# Patient Record
Sex: MALE | Race: WHITE | NOT HISPANIC OR LATINO
[De-identification: names, ages, dates, MRNs, and addresses within clinical notes are randomized per-mention and may not be internally consistent; named-entity substitution may affect disease eponyms.]

---

## 2017-05-15 ENCOUNTER — APPOINTMENT (OUTPATIENT)
Dept: CARDIOLOGY | Facility: CLINIC | Age: 52
End: 2017-05-15

## 2017-05-15 VITALS
WEIGHT: 173 LBS | HEIGHT: 65 IN | BODY MASS INDEX: 28.82 KG/M2 | HEART RATE: 63 BPM | DIASTOLIC BLOOD PRESSURE: 70 MMHG | SYSTOLIC BLOOD PRESSURE: 112 MMHG

## 2017-08-18 ENCOUNTER — APPOINTMENT (OUTPATIENT)
Dept: CARDIOLOGY | Facility: CLINIC | Age: 52
End: 2017-08-18

## 2017-08-18 VITALS
HEIGHT: 65 IN | SYSTOLIC BLOOD PRESSURE: 126 MMHG | HEART RATE: 93 BPM | BODY MASS INDEX: 28.66 KG/M2 | WEIGHT: 172 LBS | DIASTOLIC BLOOD PRESSURE: 74 MMHG

## 2017-08-18 DIAGNOSIS — I25.2 OLD MYOCARDIAL INFARCTION: ICD-10-CM

## 2017-08-18 RX ORDER — VENLAFAXINE 37.5 MG/1
37.5 TABLET ORAL DAILY
Refills: 0 | Status: DISCONTINUED | COMMUNITY
End: 2017-08-18

## 2017-12-08 ENCOUNTER — APPOINTMENT (OUTPATIENT)
Dept: CARDIOLOGY | Facility: CLINIC | Age: 52
End: 2017-12-08

## 2017-12-12 ENCOUNTER — MEDICATION RENEWAL (OUTPATIENT)
Age: 52
End: 2017-12-12

## 2017-12-18 ENCOUNTER — RX RENEWAL (OUTPATIENT)
Age: 52
End: 2017-12-18

## 2018-04-22 ENCOUNTER — EMERGENCY (EMERGENCY)
Facility: HOSPITAL | Age: 53
LOS: 0 days | Discharge: HOME | End: 2018-04-23
Admitting: PHYSICIAN ASSISTANT

## 2018-04-22 VITALS
HEART RATE: 73 BPM | OXYGEN SATURATION: 98 % | RESPIRATION RATE: 18 BRPM | TEMPERATURE: 97 F | SYSTOLIC BLOOD PRESSURE: 131 MMHG | DIASTOLIC BLOOD PRESSURE: 90 MMHG
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
## 2018-04-22 DIAGNOSIS — S61.214A LACERATION WITHOUT FOREIGN BODY OF RIGHT RING FINGER WITHOUT DAMAGE TO NAIL, INITIAL ENCOUNTER: ICD-10-CM

## 2018-04-22 DIAGNOSIS — S62.644A NONDISPLACED FRACTURE OF PROXIMAL PHALANX OF RIGHT RING FINGER, INITIAL ENCOUNTER FOR CLOSED FRACTURE: ICD-10-CM
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
## 2018-04-22 DIAGNOSIS — Z23 ENCOUNTER FOR IMMUNIZATION: ICD-10-CM

## 2018-04-22 DIAGNOSIS — Z79.899 OTHER LONG TERM (CURRENT) DRUG THERAPY: ICD-10-CM

## 2018-04-23 ENCOUNTER — EMERGENCY (EMERGENCY)
Facility: HOSPITAL | Age: 53
LOS: 1 days | Discharge: HOME | End: 2018-04-23
Admitting: PHYSICIAN ASSISTANT

## 2018-04-23 VITALS
HEART RATE: 80 BPM | SYSTOLIC BLOOD PRESSURE: 133 MMHG | TEMPERATURE: 98 F | RESPIRATION RATE: 18 BRPM | DIASTOLIC BLOOD PRESSURE: 72 MMHG | OXYGEN SATURATION: 98 %

## 2018-04-23 DIAGNOSIS — E78.5 HYPERLIPIDEMIA, UNSPECIFIED: ICD-10-CM
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
## 2018-04-23 DIAGNOSIS — I10 ESSENTIAL (PRIMARY) HYPERTENSION: ICD-10-CM

## 2018-04-23 DIAGNOSIS — Z79.2 LONG TERM (CURRENT) USE OF ANTIBIOTICS: ICD-10-CM
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
## 2018-04-23 DIAGNOSIS — M79.644 PAIN IN RIGHT FINGER(S): ICD-10-CM

## 2018-04-23 DIAGNOSIS — I25.10 ATHEROSCLEROTIC HEART DISEASE OF NATIVE CORONARY ARTERY WITHOUT ANGINA PECTORIS: ICD-10-CM
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
## 2018-04-23 DIAGNOSIS — S60.142A CONTUSION OF LEFT RING FINGER WITH DAMAGE TO NAIL, INITIAL ENCOUNTER: ICD-10-CM

## 2018-04-23 DIAGNOSIS — S62.604A FRACTURE OF UNSPECIFIED PHALANX OF RIGHT RING FINGER, INITIAL ENCOUNTER FOR CLOSED FRACTURE: ICD-10-CM

## 2018-04-23 RX ORDER — TETANUS TOXOID, REDUCED DIPHTHERIA TOXOID AND ACELLULAR PERTUSSIS VACCINE, ADSORBED 5; 2.5; 8; 8; 2.5 [IU]/.5ML; [IU]/.5ML; UG/.5ML; UG/.5ML; UG/.5ML
0.5 SUSPENSION INTRAMUSCULAR ONCE
Qty: 0 | Refills: 0 | Status: COMPLETED | OUTPATIENT
Start: 2018-04-23 | End: 2018-04-23

## 2018-04-23 RX ORDER — CEPHALEXIN 500 MG
1 CAPSULE ORAL
Qty: 28 | Refills: 0 | OUTPATIENT
Start: 2018-04-23 | End: 2018-04-29

## 2018-04-23 RX ORDER — CEPHALEXIN 500 MG
500 CAPSULE ORAL ONCE
Qty: 0 | Refills: 0 | Status: COMPLETED | OUTPATIENT
Start: 2018-04-23 | End: 2018-04-23

## 2018-04-23 RX ADMIN — TETANUS TOXOID, REDUCED DIPHTHERIA TOXOID AND ACELLULAR PERTUSSIS VACCINE, ADSORBED 0.5 MILLILITER(S): 5; 2.5; 8; 8; 2.5 SUSPENSION INTRAMUSCULAR at 01:25

## 2018-04-23 RX ADMIN — Medication 500 MILLIGRAM(S): at 01:25

## 2018-04-23 NOTE — ED PROVIDER NOTE - OBJECTIVE STATEMENT
51 yo male with h/o CAD, HTN, HLD presents to the ED c/o bleeding from his right 4th fingernail since yesterday. Patient was evaluated in ED yesterday for injury to same finger. + fx on xray, started in abx. Patient states hes still has small amount of bleeding from fingernail since yesterday. Denies fever, chills, UE weakness or paresthesias.

## 2018-04-23 NOTE — ED PROVIDER NOTE - PHYSICAL EXAMINATION
right hand with tenderness and swelling to distal tip, there is 100% subungal hematoma. FROM of all the joints and sensory intact

## 2018-04-23 NOTE — ED PROVIDER NOTE - PHYSICAL EXAMINATION
GENERAL:  well appearing, non-toxic male in no acute distress  SKIN: skin warm, pink and dry. MMM. + small amount of bleeding from right 4th fingernail. + abrasion. + subungual hematoma. cap refill < 2 sec  PULM: CTAB. Normal respiratory effort. No respiratory distress. No wheezes, stridor, rales or rhonchi. No retractions  CV: RRR, no M/R/G.   MSK: + TTP to distal tip of right 4th finger. FROM of DIP and PIP. radial pulses equal and intact bilaterally.  NEURO: sensation of right hand and digits intact

## 2018-04-23 NOTE — ED PROVIDER NOTE - NS ED ROS FT
Constitutional: no fever, chills or generalized weakness   Cardiovascular: no chest pain, no sob  Respiratory: no cough, no shortness of breath  Musculoskeletal: + right 4th finger fx  Integumentary: + bleeding from right 4th fingernail since yesterday  Neurological: no UE weakness or paresthesias

## 2018-09-10 ENCOUNTER — EMERGENCY (EMERGENCY)
Facility: HOSPITAL | Age: 53
LOS: 0 days | Discharge: HOME | End: 2018-09-11
Attending: STUDENT IN AN ORGANIZED HEALTH CARE EDUCATION/TRAINING PROGRAM | Admitting: STUDENT IN AN ORGANIZED HEALTH CARE EDUCATION/TRAINING PROGRAM

## 2018-09-10 VITALS
SYSTOLIC BLOOD PRESSURE: 175 MMHG | TEMPERATURE: 98 F | DIASTOLIC BLOOD PRESSURE: 90 MMHG | OXYGEN SATURATION: 100 % | HEART RATE: 95 BPM | RESPIRATION RATE: 16 BRPM | WEIGHT: 171.96 LBS

## 2018-09-10 DIAGNOSIS — R07.89 OTHER CHEST PAIN: ICD-10-CM

## 2018-09-10 DIAGNOSIS — R51 HEADACHE: ICD-10-CM

## 2018-09-10 DIAGNOSIS — I25.10 ATHEROSCLEROTIC HEART DISEASE OF NATIVE CORONARY ARTERY WITHOUT ANGINA PECTORIS: ICD-10-CM

## 2018-09-10 DIAGNOSIS — E78.5 HYPERLIPIDEMIA, UNSPECIFIED: ICD-10-CM

## 2018-09-10 DIAGNOSIS — Z79.2 LONG TERM (CURRENT) USE OF ANTIBIOTICS: ICD-10-CM

## 2018-09-10 DIAGNOSIS — Z79.82 LONG TERM (CURRENT) USE OF ASPIRIN: ICD-10-CM

## 2018-09-10 DIAGNOSIS — Z95.5 PRESENCE OF CORONARY ANGIOPLASTY IMPLANT AND GRAFT: ICD-10-CM

## 2018-09-10 DIAGNOSIS — R42 DIZZINESS AND GIDDINESS: ICD-10-CM

## 2018-09-10 DIAGNOSIS — Z79.02 LONG TERM (CURRENT) USE OF ANTITHROMBOTICS/ANTIPLATELETS: ICD-10-CM

## 2018-09-10 LAB
ALBUMIN SERPL ELPH-MCNC: 5 G/DL — SIGNIFICANT CHANGE UP (ref 3.5–5.2)
ALP SERPL-CCNC: 75 U/L — SIGNIFICANT CHANGE UP (ref 30–115)
ALT FLD-CCNC: 54 U/L — HIGH (ref 0–41)
ANION GAP SERPL CALC-SCNC: 16 MMOL/L — HIGH (ref 7–14)
AST SERPL-CCNC: 28 U/L — SIGNIFICANT CHANGE UP (ref 0–41)
BASOPHILS # BLD AUTO: 0.05 K/UL — SIGNIFICANT CHANGE UP (ref 0–0.2)
BASOPHILS NFR BLD AUTO: 0.6 % — SIGNIFICANT CHANGE UP (ref 0–1)
BILIRUB SERPL-MCNC: 1.1 MG/DL — SIGNIFICANT CHANGE UP (ref 0.2–1.2)
BUN SERPL-MCNC: 12 MG/DL — SIGNIFICANT CHANGE UP (ref 10–20)
CALCIUM SERPL-MCNC: 10 MG/DL — SIGNIFICANT CHANGE UP (ref 8.5–10.1)
CHLORIDE SERPL-SCNC: 99 MMOL/L — SIGNIFICANT CHANGE UP (ref 98–110)
CO2 SERPL-SCNC: 27 MMOL/L — SIGNIFICANT CHANGE UP (ref 17–32)
CREAT SERPL-MCNC: 1 MG/DL — SIGNIFICANT CHANGE UP (ref 0.7–1.5)
EOSINOPHIL # BLD AUTO: 0.29 K/UL — SIGNIFICANT CHANGE UP (ref 0–0.7)
EOSINOPHIL NFR BLD AUTO: 3.4 % — SIGNIFICANT CHANGE UP (ref 0–8)
GLUCOSE SERPL-MCNC: 100 MG/DL — HIGH (ref 70–99)
HCT VFR BLD CALC: 44.6 % — SIGNIFICANT CHANGE UP (ref 42–52)
HGB BLD-MCNC: 14.6 G/DL — SIGNIFICANT CHANGE UP (ref 14–18)
IMM GRANULOCYTES NFR BLD AUTO: 0.1 % — SIGNIFICANT CHANGE UP (ref 0.1–0.3)
LYMPHOCYTES # BLD AUTO: 2.91 K/UL — SIGNIFICANT CHANGE UP (ref 1.2–3.4)
LYMPHOCYTES # BLD AUTO: 33.9 % — SIGNIFICANT CHANGE UP (ref 20.5–51.1)
MAGNESIUM SERPL-MCNC: 2.2 MG/DL — SIGNIFICANT CHANGE UP (ref 1.8–2.4)
MCHC RBC-ENTMCNC: 27 PG — SIGNIFICANT CHANGE UP (ref 27–31)
MCHC RBC-ENTMCNC: 32.7 G/DL — SIGNIFICANT CHANGE UP (ref 32–37)
MCV RBC AUTO: 82.4 FL — SIGNIFICANT CHANGE UP (ref 80–94)
MONOCYTES # BLD AUTO: 0.71 K/UL — HIGH (ref 0.1–0.6)
MONOCYTES NFR BLD AUTO: 8.3 % — SIGNIFICANT CHANGE UP (ref 1.7–9.3)
NEUTROPHILS # BLD AUTO: 4.62 K/UL — SIGNIFICANT CHANGE UP (ref 1.4–6.5)
NEUTROPHILS NFR BLD AUTO: 53.7 % — SIGNIFICANT CHANGE UP (ref 42.2–75.2)
NRBC # BLD: 0 /100 WBCS — SIGNIFICANT CHANGE UP (ref 0–0)
PHOSPHATE SERPL-MCNC: 4.5 MG/DL — SIGNIFICANT CHANGE UP (ref 2.1–4.9)
PLATELET # BLD AUTO: 335 K/UL — SIGNIFICANT CHANGE UP (ref 130–400)
POTASSIUM SERPL-MCNC: 4.4 MMOL/L — SIGNIFICANT CHANGE UP (ref 3.5–5)
POTASSIUM SERPL-SCNC: 4.4 MMOL/L — SIGNIFICANT CHANGE UP (ref 3.5–5)
PROT SERPL-MCNC: 8.1 G/DL — HIGH (ref 6–8)
RBC # BLD: 5.41 M/UL — SIGNIFICANT CHANGE UP (ref 4.7–6.1)
RBC # FLD: 12.6 % — SIGNIFICANT CHANGE UP (ref 11.5–14.5)
SODIUM SERPL-SCNC: 142 MMOL/L — SIGNIFICANT CHANGE UP (ref 135–146)
WBC # BLD: 8.59 K/UL — SIGNIFICANT CHANGE UP (ref 4.8–10.8)
WBC # FLD AUTO: 8.59 K/UL — SIGNIFICANT CHANGE UP (ref 4.8–10.8)

## 2018-09-10 RX ORDER — PROCHLORPERAZINE MALEATE 5 MG
10 TABLET ORAL ONCE
Qty: 0 | Refills: 0 | Status: COMPLETED | OUTPATIENT
Start: 2018-09-10 | End: 2018-09-10

## 2018-09-10 RX ORDER — MECLIZINE HCL 12.5 MG
50 TABLET ORAL ONCE
Qty: 0 | Refills: 0 | Status: COMPLETED | OUTPATIENT
Start: 2018-09-10 | End: 2018-09-10

## 2018-09-10 RX ORDER — SODIUM CHLORIDE 9 MG/ML
1000 INJECTION, SOLUTION INTRAVENOUS ONCE
Qty: 0 | Refills: 0 | Status: COMPLETED | OUTPATIENT
Start: 2018-09-10 | End: 2018-09-10

## 2018-09-10 RX ORDER — KETOROLAC TROMETHAMINE 30 MG/ML
15 SYRINGE (ML) INJECTION ONCE
Qty: 0 | Refills: 0 | Status: DISCONTINUED | OUTPATIENT
Start: 2018-09-10 | End: 2018-09-10

## 2018-09-10 RX ADMIN — Medication 15 MILLIGRAM(S): at 22:32

## 2018-09-10 RX ADMIN — Medication 15 MILLIGRAM(S): at 23:22

## 2018-09-10 RX ADMIN — SODIUM CHLORIDE 2000 MILLILITER(S): 9 INJECTION, SOLUTION INTRAVENOUS at 22:33

## 2018-09-10 RX ADMIN — Medication 10 MILLIGRAM(S): at 22:10

## 2018-09-10 RX ADMIN — Medication 50 MILLIGRAM(S): at 22:33

## 2018-09-10 RX ADMIN — SODIUM CHLORIDE 1000 MILLILITER(S): 9 INJECTION, SOLUTION INTRAVENOUS at 23:22

## 2018-09-10 NOTE — ED PROVIDER NOTE - OBJECTIVE STATEMENT
52 y/o male with PMH of CAD s/p bypass and 1 stent on ASA and Plavix, HLD who presents to ED for headache, dizziness and chest pain. Patient states that for the past 2 weeks he has had a heavy pressure in the left side of his chest. No diaphoresis,  radiation of pain, nausea. For the past 1 day patient has had a headache described as a pressure in the front of his head. No photophobia, phonophobia, change in vision or hearing. Pt also states that he has had dizziness described as the room spinning. Pt has a history of headache and dizziness and states symptoms feel similar to prior episodes. Pt has taken no medications for pain or dizziness.

## 2018-09-10 NOTE — ED ADULT NURSE NOTE - NSIMPLEMENTINTERV_GEN_ALL_ED
Implemented All Universal Safety Interventions:  [place] to call system. Call bell, personal items and telephone within reach. Instruct patient to call for assistance. Room bathroom lighting operational. Non-slip footwear when patient is off stretcher. Physically safe environment: no spills, clutter or unnecessary equipment. Stretcher in lowest position, wheels locked, appropriate side rails in place.

## 2018-09-10 NOTE — ED PROVIDER NOTE - ATTENDING CONTRIBUTION TO CARE
53 hx hld, cad s/p bypass, stent, on asa/plavix here for 1 day hx HA and dizziness. tight frontal pain and in jaw. pt went for a walk and had vertiginous dizziness. similar ha/dizziness in past.  vertigo worse with head movement. in past sx typically improve spontaneously. pt seen before for this, was admitted for a day and not given dx per pt. ha gradual onset, not worst headache of his life. dizziness lasted longer than usual but has since resolved. because of increased duration, pt came in out of concern it might be related to his heart. no cp, sob, n,v, f,c,hearing change, ataxia, gait instability.     vss  exam  card-rrr  lungs-ctab  abd-sntnd  neuro-horizontal nystagmus to the right, vertigo elicited with head movement. HINTS suggesting peripheral cause  Head impulse test: loss of fixation with corrective saccades when head turned to the left  Nystagmus: unidirectional, horizontal right-beating nystagmus  Skew deviation: grossly absent    basic labs w/ trop, ekg, cth  sup care, fluids, analgesia  likely tension ha w/ peripheral vertigo based on hx and exam  if w/u negative, can f/u w/ neuro 53 hx hld, cad s/p bypass, stent, on asa/plavix here for 1 day hx HA and dizziness. tight frontal pain and in jaw. pt went for a walk and had vertiginous dizziness. similar ha/dizziness in past.  vertigo worse with head movement. in past sx typically improve spontaneously. pt seen before for this, was admitted for a day and not given dx per pt. ha gradual onset, not worst headache of his life. dizziness lasted longer than usual but has since resolved. because of increased duration, pt came in out of concern it might be related to his heart. no sob, n,v, f,c,hearing change, ataxia, gait instability.   To me, pt also endorsed 2-3 weeks of intermitent L sided chest pressure. often in morning and without relation to exertion. no lightheadedness, sob, during those episodes. pt endorsing L sided chest earlier today. last stress test/card w/u > 1 year ago    vss  exam  card-rrr  lungs-ctab  abd-sntnd  neuro-horizontal nystagmus to the right, vertigo elicited with head movement. HINTS suggesting peripheral cause  Head impulse test: loss of fixation with corrective saccades when head turned to the left  Nystagmus: unidirectional, horizontal right-beating nystagmus  Skew deviation: grossly absent    basic labs w/ trop, ekg, cth  sup care, fluids, analgesia  likely tension ha w/ peripheral vertigo based on hx and exam  will obs for cp r/o

## 2018-09-10 NOTE — ED PROVIDER NOTE - PHYSICAL EXAMINATION
CONSTITUTIONAL: Well-developed; well-nourished; in no acute distress.   SKIN: warm, dry  HEAD: Normocephalic; atraumatic.  EYES: no conjunctival injection. PERRL.   ENT: No nasal discharge; airway clear.  NECK: Supple; non tender.  CARD: S1, S2 normal; no murmurs, gallops, or rubs. Regular rate and rhythm.   RESP: No wheezes, rales or rhonchi.  ABD: soft ntnd  EXT: Normal ROM.  No clubbing, cyanosis or edema.   LYMPH: No acute cervical adenopathy.  NEURO: Alert, oriented, grossly unremarkable. CNs 2-12 intact. Normal gait and station.   PSYCH: Cooperative, appropriate.

## 2018-09-10 NOTE — ED PROVIDER NOTE - PROGRESS NOTE DETAILS
ha/vertiginous dizziness improving I had extensive discussion of risks and benefits of pursuing further medical evaluation and/or care with patient and any available family/friends, including but not limited to worsening of clinical status, disability, and death; patient still electing to leave against medical advice. Patient is awake, alert, oriented and demonstrates full capacity and insight into illness. Patient aware and encouraged to return immediately to this ED or nearest ED if patient decides to change mind regarding care or if patient experiences any new, worsening, or concerning symptoms. Any available test results were discussed with patient and/or family. Verbal instructions given, patient endorsed understanding. Written discharge instructions additionally given, including follow-up plan.

## 2018-09-10 NOTE — ED PROVIDER NOTE - NS ED ROS FT
Constitutional: See HPI.  Eyes: No visual changes, eye pain or discharge.  ENMT: No hearing changes, pain, discharge or infections. No neck pain or stiffness.  Cardiac: as per HPI.   Respiratory: No cough or respiratory distress.   GI: No nausea, vomiting, diarrhea or abdominal pain.  : No dysuria, frequency or burning.  MS: No myalgia, muscle weakness, joint pain or back pain.  Neuro: Dizziness.  No headache or weakness. No LOC.  Skin: No skin rash.

## 2018-09-10 NOTE — ED ADULT NURSE NOTE - OBJECTIVE STATEMENT
c/o headache and jaw pain that started this am. denies any photophobia, no nausea, no vomiting  report some drowsiness in the evening  denies taking any otc medication for the pain, only took his prescribed heart medicaitons

## 2018-09-10 NOTE — ED PROVIDER NOTE - MEDICAL DECISION MAKING DETAILS
would like to admit pt for moderate risk CP, however, pt choosing to AMA. vertiginous sx improved w/ supp care.

## 2018-09-11 VITALS
HEART RATE: 79 BPM | RESPIRATION RATE: 18 BRPM | OXYGEN SATURATION: 98 % | DIASTOLIC BLOOD PRESSURE: 76 MMHG | SYSTOLIC BLOOD PRESSURE: 126 MMHG

## 2018-09-11 LAB — TROPONIN T SERPL-MCNC: <0.01 NG/ML — SIGNIFICANT CHANGE UP

## 2018-09-11 RX ORDER — MECLIZINE HCL 12.5 MG
25 TABLET ORAL ONCE
Qty: 0 | Refills: 0 | Status: COMPLETED | OUTPATIENT
Start: 2018-09-11 | End: 2018-09-11

## 2018-09-11 RX ADMIN — Medication 25 MILLIGRAM(S): at 05:16

## 2018-10-26 ENCOUNTER — APPOINTMENT (OUTPATIENT)
Dept: CARDIOLOGY | Facility: CLINIC | Age: 53
End: 2018-10-26

## 2018-10-26 VITALS
HEART RATE: 84 BPM | WEIGHT: 169 LBS | BODY MASS INDEX: 28.16 KG/M2 | DIASTOLIC BLOOD PRESSURE: 74 MMHG | SYSTOLIC BLOOD PRESSURE: 142 MMHG | HEIGHT: 65 IN

## 2019-02-01 ENCOUNTER — APPOINTMENT (OUTPATIENT)
Dept: CARDIOLOGY | Facility: CLINIC | Age: 54
End: 2019-02-01

## 2019-02-01 VITALS
HEART RATE: 87 BPM | BODY MASS INDEX: 28.32 KG/M2 | HEIGHT: 65 IN | DIASTOLIC BLOOD PRESSURE: 72 MMHG | WEIGHT: 170 LBS | SYSTOLIC BLOOD PRESSURE: 128 MMHG

## 2019-02-01 NOTE — PHYSICAL EXAM
[General Appearance - Well Developed] : well developed [Normal Appearance] : normal appearance [Well Groomed] : well groomed [General Appearance - Well Nourished] : well nourished [No Deformities] : no deformities [General Appearance - In No Acute Distress] : no acute distress [Normal Oral Mucosa] : normal oral mucosa [Normal Jugular Venous A Waves Present] : normal jugular venous A waves present [Normal Jugular Venous V Waves Present] : normal jugular venous V waves present [No Jugular Venous Bolanos A Waves] : no jugular venous bolanos A waves [Respiration, Rhythm And Depth] : normal respiratory rhythm and effort [Exaggerated Use Of Accessory Muscles For Inspiration] : no accessory muscle use [Auscultation Breath Sounds / Voice Sounds] : lungs were clear to auscultation bilaterally [Heart Rate And Rhythm] : heart rate and rhythm were normal [Heart Sounds] : normal S1 and S2 [Murmurs] : no murmurs present [Abdomen Soft] : soft [Abdomen Tenderness] : non-tender [Abdomen Mass (___ Cm)] : no abdominal mass palpated [Nail Clubbing] : no clubbing of the fingernails [Cyanosis, Localized] : no localized cyanosis [Petechial Hemorrhages (___cm)] : no petechial hemorrhages [] : no ischemic changes [Skin Color & Pigmentation] : normal skin color and pigmentation

## 2019-02-01 NOTE — ASSESSMENT
[FreeTextEntry1] : 52 yo male with pmhx and presentation as above\par cad, ccs class 2/3 symptoms\par meds compliance reinforced\par repeat labs today\par stress mpi\par 2d echo\par f/up with neuro/psych\par aggressive risk modif\par diet and act as tolerated\par rtc post testing\par

## 2019-02-01 NOTE — HISTORY OF PRESENT ILLNESS
[FreeTextEntry1] : 54 yo male with pmhx of cad/mi/abg/pci is here for a f/up\par extensive card hx, mics cabg 2012, followed by pci with tejinder 2014\par was lost to follow\par ongoing chest pain, unchanged, s/p stress mpi - low risk 2016\par et is mild to mod limited sec to a variety reasons\par under neuro, psych and ortho mx, on ssri\par few episodes of nocturnal angina \par no other cvs complains\par ros is otherwise as below

## 2019-03-19 ENCOUNTER — APPOINTMENT (OUTPATIENT)
Dept: CARDIOLOGY | Facility: CLINIC | Age: 54
End: 2019-03-19

## 2019-03-27 ENCOUNTER — LABORATORY RESULT (OUTPATIENT)
Age: 54
End: 2019-03-27

## 2019-03-27 ENCOUNTER — OUTPATIENT (OUTPATIENT)
Dept: OUTPATIENT SERVICES | Facility: HOSPITAL | Age: 54
LOS: 1 days | Discharge: HOME | End: 2019-03-27

## 2019-03-27 DIAGNOSIS — E78.5 HYPERLIPIDEMIA, UNSPECIFIED: ICD-10-CM

## 2019-04-19 ENCOUNTER — APPOINTMENT (OUTPATIENT)
Dept: CARDIOLOGY | Facility: CLINIC | Age: 54
End: 2019-04-19

## 2019-05-08 ENCOUNTER — APPOINTMENT (OUTPATIENT)
Dept: CARDIOLOGY | Facility: CLINIC | Age: 54
End: 2019-05-08
Payer: OTHER MISCELLANEOUS

## 2019-05-08 VITALS
HEART RATE: 67 BPM | BODY MASS INDEX: 28.32 KG/M2 | WEIGHT: 170 LBS | SYSTOLIC BLOOD PRESSURE: 116 MMHG | HEIGHT: 65 IN | DIASTOLIC BLOOD PRESSURE: 70 MMHG

## 2019-05-08 DIAGNOSIS — I25.10 ATHEROSCLEROTIC HEART DISEASE OF NATIVE CORONARY ARTERY W/OUT ANGINA PECTORIS: ICD-10-CM

## 2019-05-08 DIAGNOSIS — E78.5 HYPERLIPIDEMIA, UNSPECIFIED: ICD-10-CM

## 2019-05-08 DIAGNOSIS — I20.9 ANGINA PECTORIS, UNSPECIFIED: ICD-10-CM

## 2019-05-08 DIAGNOSIS — I42.9 CARDIOMYOPATHY, UNSPECIFIED: ICD-10-CM

## 2019-05-08 PROCEDURE — 99214 OFFICE O/P EST MOD 30 MIN: CPT

## 2019-05-08 PROCEDURE — 93000 ELECTROCARDIOGRAM COMPLETE: CPT

## 2019-05-08 RX ORDER — GABAPENTIN 100 MG/1
100 CAPSULE ORAL 3 TIMES DAILY
Refills: 0 | Status: ACTIVE | COMMUNITY

## 2019-05-08 NOTE — PHYSICAL EXAM
[Normal Appearance] : normal appearance [General Appearance - Well Developed] : well developed [Well Groomed] : well groomed [General Appearance - Well Nourished] : well nourished [No Deformities] : no deformities [Normal Oral Mucosa] : normal oral mucosa [General Appearance - In No Acute Distress] : no acute distress [Normal Jugular Venous A Waves Present] : normal jugular venous A waves present [No Jugular Venous Bolanos A Waves] : no jugular venous bolanos A waves [Normal Jugular Venous V Waves Present] : normal jugular venous V waves present [Exaggerated Use Of Accessory Muscles For Inspiration] : no accessory muscle use [Respiration, Rhythm And Depth] : normal respiratory rhythm and effort [Heart Sounds] : normal S1 and S2 [Auscultation Breath Sounds / Voice Sounds] : lungs were clear to auscultation bilaterally [Heart Rate And Rhythm] : heart rate and rhythm were normal [Abdomen Soft] : soft [Murmurs] : no murmurs present [Abdomen Tenderness] : non-tender [Nail Clubbing] : no clubbing of the fingernails [Abdomen Mass (___ Cm)] : no abdominal mass palpated [] : no ischemic changes [Cyanosis, Localized] : no localized cyanosis [Petechial Hemorrhages (___cm)] : no petechial hemorrhages [Skin Color & Pigmentation] : normal skin color and pigmentation

## 2019-05-08 NOTE — ASSESSMENT
[FreeTextEntry1] : 54 yo male with pmhx and presentation as above\par cad, ccs class 2/3 symptoms\par meds compliance reinforced\par repeat labs reviewed, increase statin to 80\par stress mpi rx given\par 2d echo - nl lv fnx\par f/up with neuro/psych\par aggressive risk modif\par diet and act as tolerated\par rtc post testing\par

## 2019-05-08 NOTE — HISTORY OF PRESENT ILLNESS
[FreeTextEntry1] : 52 yo male with pmhx of cad/mi/abg/pci is here for a f/up\par extensive card hx, mics cabg 2012, followed by pci with tejinder 2014\par was lost to follow\par ongoing chest pain, unchanged, missed stress mpi appoinment\par et is mild to mod limited sec to a variety reasons\par under neuro, psych and ortho mx, on ssri\par few episodes of nocturnal angina \par no other cvs complains\par s/p echo/labs\par ros is otherwise as below

## 2019-08-16 ENCOUNTER — APPOINTMENT (OUTPATIENT)
Dept: CARDIOLOGY | Facility: CLINIC | Age: 54
End: 2019-08-16

## 2019-10-11 ENCOUNTER — APPOINTMENT (OUTPATIENT)
Dept: CARDIOLOGY | Facility: CLINIC | Age: 54
End: 2019-10-11

## 2019-11-05 ENCOUNTER — RX RENEWAL (OUTPATIENT)
Age: 54
End: 2019-11-05

## 2019-11-05 RX ORDER — LOSARTAN POTASSIUM 50 MG/1
50 TABLET, FILM COATED ORAL DAILY
Qty: 90 | Refills: 3 | Status: ACTIVE | COMMUNITY
Start: 2019-11-05 | End: 1900-01-01

## 2019-12-23 ENCOUNTER — RX RENEWAL (OUTPATIENT)
Age: 54
End: 2019-12-23

## 2020-06-02 RX ORDER — ASPIRIN 81 MG/1
81 TABLET, COATED ORAL DAILY
Qty: 90 | Refills: 3 | Status: ACTIVE | COMMUNITY
Start: 2019-02-01 | End: 1900-01-01

## 2021-10-09 ENCOUNTER — EMERGENCY (EMERGENCY)
Facility: HOSPITAL | Age: 56
LOS: 0 days | Discharge: LEFT BEFORE DISCHARGE | End: 2021-10-09
Attending: EMERGENCY MEDICINE | Admitting: EMERGENCY MEDICINE
Payer: MEDICAID

## 2021-10-09 VITALS
DIASTOLIC BLOOD PRESSURE: 82 MMHG | OXYGEN SATURATION: 99 % | RESPIRATION RATE: 18 BRPM | SYSTOLIC BLOOD PRESSURE: 184 MMHG | TEMPERATURE: 99 F | HEART RATE: 99 BPM

## 2021-10-09 DIAGNOSIS — R07.89 OTHER CHEST PAIN: ICD-10-CM

## 2021-10-09 DIAGNOSIS — Z91.19 PATIENT'S NONCOMPLIANCE WITH OTHER MEDICAL TREATMENT AND REGIMEN: ICD-10-CM

## 2021-10-09 DIAGNOSIS — R06.02 SHORTNESS OF BREATH: ICD-10-CM

## 2021-10-09 DIAGNOSIS — Z95.1 PRESENCE OF AORTOCORONARY BYPASS GRAFT: ICD-10-CM

## 2021-10-09 DIAGNOSIS — E78.5 HYPERLIPIDEMIA, UNSPECIFIED: ICD-10-CM

## 2021-10-09 DIAGNOSIS — I10 ESSENTIAL (PRIMARY) HYPERTENSION: ICD-10-CM

## 2021-10-09 DIAGNOSIS — I25.10 ATHEROSCLEROTIC HEART DISEASE OF NATIVE CORONARY ARTERY WITHOUT ANGINA PECTORIS: ICD-10-CM

## 2021-10-09 DIAGNOSIS — Z53.29 PROCEDURE AND TREATMENT NOT CARRIED OUT BECAUSE OF PATIENT'S DECISION FOR OTHER REASONS: ICD-10-CM

## 2021-10-09 LAB
ALBUMIN SERPL ELPH-MCNC: 4.7 G/DL — SIGNIFICANT CHANGE UP (ref 3.5–5.2)
ALP SERPL-CCNC: 75 U/L — SIGNIFICANT CHANGE UP (ref 30–115)
ALT FLD-CCNC: 80 U/L — HIGH (ref 0–41)
ANION GAP SERPL CALC-SCNC: 14 MMOL/L — SIGNIFICANT CHANGE UP (ref 7–14)
AST SERPL-CCNC: 39 U/L — SIGNIFICANT CHANGE UP (ref 0–41)
BASOPHILS # BLD AUTO: 0.04 K/UL — SIGNIFICANT CHANGE UP (ref 0–0.2)
BASOPHILS NFR BLD AUTO: 0.5 % — SIGNIFICANT CHANGE UP (ref 0–1)
BILIRUB SERPL-MCNC: 1.3 MG/DL — HIGH (ref 0.2–1.2)
BUN SERPL-MCNC: 14 MG/DL — SIGNIFICANT CHANGE UP (ref 10–20)
CALCIUM SERPL-MCNC: 9.7 MG/DL — SIGNIFICANT CHANGE UP (ref 8.5–10.1)
CHLORIDE SERPL-SCNC: 103 MMOL/L — SIGNIFICANT CHANGE UP (ref 98–110)
CO2 SERPL-SCNC: 24 MMOL/L — SIGNIFICANT CHANGE UP (ref 17–32)
CREAT SERPL-MCNC: 1 MG/DL — SIGNIFICANT CHANGE UP (ref 0.7–1.5)
EOSINOPHIL # BLD AUTO: 0.21 K/UL — SIGNIFICANT CHANGE UP (ref 0–0.7)
EOSINOPHIL NFR BLD AUTO: 2.4 % — SIGNIFICANT CHANGE UP (ref 0–8)
GLUCOSE SERPL-MCNC: 113 MG/DL — HIGH (ref 70–99)
HCT VFR BLD CALC: 45.6 % — SIGNIFICANT CHANGE UP (ref 42–52)
HGB BLD-MCNC: 14.9 G/DL — SIGNIFICANT CHANGE UP (ref 14–18)
IMM GRANULOCYTES NFR BLD AUTO: 0.2 % — SIGNIFICANT CHANGE UP (ref 0.1–0.3)
LYMPHOCYTES # BLD AUTO: 2.78 K/UL — SIGNIFICANT CHANGE UP (ref 1.2–3.4)
LYMPHOCYTES # BLD AUTO: 31.7 % — SIGNIFICANT CHANGE UP (ref 20.5–51.1)
MAGNESIUM SERPL-MCNC: 2 MG/DL — SIGNIFICANT CHANGE UP (ref 1.8–2.4)
MCHC RBC-ENTMCNC: 27.1 PG — SIGNIFICANT CHANGE UP (ref 27–31)
MCHC RBC-ENTMCNC: 32.7 G/DL — SIGNIFICANT CHANGE UP (ref 32–37)
MCV RBC AUTO: 83.1 FL — SIGNIFICANT CHANGE UP (ref 80–94)
MONOCYTES # BLD AUTO: 0.75 K/UL — HIGH (ref 0.1–0.6)
MONOCYTES NFR BLD AUTO: 8.6 % — SIGNIFICANT CHANGE UP (ref 1.7–9.3)
NEUTROPHILS # BLD AUTO: 4.97 K/UL — SIGNIFICANT CHANGE UP (ref 1.4–6.5)
NEUTROPHILS NFR BLD AUTO: 56.6 % — SIGNIFICANT CHANGE UP (ref 42.2–75.2)
NRBC # BLD: 0 /100 WBCS — SIGNIFICANT CHANGE UP (ref 0–0)
NT-PROBNP SERPL-SCNC: 24 PG/ML — SIGNIFICANT CHANGE UP (ref 0–300)
PLATELET # BLD AUTO: 338 K/UL — SIGNIFICANT CHANGE UP (ref 130–400)
POTASSIUM SERPL-MCNC: 4.3 MMOL/L — SIGNIFICANT CHANGE UP (ref 3.5–5)
POTASSIUM SERPL-SCNC: 4.3 MMOL/L — SIGNIFICANT CHANGE UP (ref 3.5–5)
PROT SERPL-MCNC: 7.9 G/DL — SIGNIFICANT CHANGE UP (ref 6–8)
RBC # BLD: 5.49 M/UL — SIGNIFICANT CHANGE UP (ref 4.7–6.1)
RBC # FLD: 12.6 % — SIGNIFICANT CHANGE UP (ref 11.5–14.5)
SODIUM SERPL-SCNC: 141 MMOL/L — SIGNIFICANT CHANGE UP (ref 135–146)
TROPONIN T SERPL-MCNC: <0.01 NG/ML — SIGNIFICANT CHANGE UP
WBC # BLD: 8.77 K/UL — SIGNIFICANT CHANGE UP (ref 4.8–10.8)
WBC # FLD AUTO: 8.77 K/UL — SIGNIFICANT CHANGE UP (ref 4.8–10.8)

## 2021-10-09 PROCEDURE — 71046 X-RAY EXAM CHEST 2 VIEWS: CPT | Mod: 26

## 2021-10-09 PROCEDURE — 93010 ELECTROCARDIOGRAM REPORT: CPT

## 2021-10-09 PROCEDURE — 99285 EMERGENCY DEPT VISIT HI MDM: CPT

## 2021-10-09 RX ORDER — ASPIRIN/CALCIUM CARB/MAGNESIUM 324 MG
81 TABLET ORAL ONCE
Refills: 0 | Status: COMPLETED | OUTPATIENT
Start: 2021-10-09 | End: 2021-10-09

## 2021-10-09 RX ADMIN — Medication 81 MILLIGRAM(S): at 18:50

## 2021-10-09 NOTE — ED PROVIDER NOTE - CLINICAL SUMMARY MEDICAL DECISION MAKING FREE TEXT BOX
ed work up unremarkable. pt refusing to stay for admission, cards c/s, observation, or event repeat CE/ekg. has capacity to refuse. understands risk of death, mi, severe morbidity. will have to sign out AMA. will f/u w his cards Dr Marylou nettles. strict return precautions provided. understands can come back anytime if changes mind.    Patient is awake/alert/interactive with normal mental status and normal neurologic function. Patient reports no SI/HI and demonstrates normal thought processes with no evidence of intoxication, delirium, delusions or hallucinations. Patient requesting to leave against medical advice at this time. Advised patient of potential risks of leaving AMA which include potential disability or death. Attempted to convince patient to stay and continue work up/treatment and patient refused. Patient has capacity to make medical decisions at this time and will be signed out AMA. Patient instructed to follow up with his primary care provider as an outpatient and is aware they can return to the emergency department at any time for evaluation.

## 2021-10-09 NOTE — ED PROVIDER NOTE - ATTENDING CONTRIBUTION TO CARE
56M PMH CAD CABG stents, HTN HL, noncompliant w meds, cards- Dr Hickman, p/w L chest pain x 2 days, radaiets to L shoulder blade, pressure squeezing intermittent at rest, sob associated. no fever, cough. no tearing bp. no le edema, le pain, immobilization, hormones, hemoptysis. no fall. last stress 2018.     on exam, AFVSS, well radha nad, ncat, eomi, perrla, mmm, lctab, rrr nl s1s2 no mrg, abd soft ntnd, aaox3, no focal deficits, no le edema or calf ttp,     a/p; CP high risk, r/o acs, will do labs, ekg/trop, CXR, asa, tele re-eval

## 2021-10-09 NOTE — ED PROVIDER NOTE - OBJECTIVE STATEMENT
57 yo male, pmh of cad s/p cabg, htn, hld, presents to ed for cp, x 2 weeks, left sided, mild, aching, no radiation. denies fever, chills, cp, sob, abd pain, nvd.

## 2021-10-09 NOTE — ED PROVIDER NOTE - CARE PROVIDER_API CALL
Elbert Fish)  Cardiovascular Disease; Internal Medicine  [address] [place] [address]  Phone: [phone]  Fax: [phone]  Follow Up Time: 1-3 Days

## 2021-10-09 NOTE — ED PROVIDER NOTE - PATIENT PORTAL LINK FT
You can access the FollowMyHealth Patient Portal offered by [facility] by registering at the following website: [email or web address]. By joining [facility]’s FollowMyHealth portal, you will also be able to view your health information using other applications (apps) compatible with our system.

## 2022-11-22 NOTE — ED ADULT NURSE NOTE - RN DISCHARGE SIGNATURE
[facility]   [address] [facility], [address] [facility]  Phone: [phone]   [phone] NOTE     Patient: Yas Mejia MRN: [id number]  PCP: Idris Mercado MD   :     1947  Age:   67 y.o. Sex:  male      Referring physician: Nichole Pearson MD  Reason for consultation: 67 y.o. male with Syncope [I69] complicated by orthostatic hypotension  Admission Date: 2022  9:35 AM  LOS: 6 days      ASSESSMENT and PLAN :   Chronic Orthostatic Hypotension with postprandial hypotension  - managed outpatient by Dr. Gagan Hendricks last visit 10/20/21  - Agree with restarting Midodrine adjustment as needed  - Monitor orthostatic BP    Pulmonary Nodule  - 1.1 cm concerning for malignancy- biopsy deferred per notes with plan for serial CT  - followed by Dr. Kristi Duffy at [facility] in pulmonary clinic    BPH  - on Proscar  - Renal US     Hypokalemia  -  s/p supplements  - recheck labs in AM    Pulmonary Emphysema  COPD- per PFTs mild obstructive disease  Prior Tobacco  Plan was reviewed with patient and wife. Subjective:   HPI: Yas Mejia is a 67 y.o.  male who has been transferred from [facility] for syncopal episode. His PMH include Chronic orthostatic hypotension, pulmonary nodule, COPD, Emphysema, Pacemaker 2019 and prior smoker. Previously at [facility] for -11/10 with witnessed seizure in ER and AM. Patient is followed outpatient by Dr. Tessa Aj with the last visit 10/20/21 for management of chronic hypotension with post prandial hypotension. His home meds lists shows that he was on midodrine along with Florinef prior to admission. In reviewing that note he was managed well on Midodrine 10 mg TID PRN. Last labs  with noted hypokalemia (K=3.0) and elevated glucose (126). He had KCL replacement on . There are no repeat labs for review. The patient is sitting up in chair today with his wife present.   The wife states she has been upset with his progression during this hospitalization. She is inquiring about having imaging of the kidney although his creatinine levels are normal. The patient does c/o hesitation and post void dribbling with urination. He denies SOB, CP, dysuria or hematuria. OT is present during interview assisting with ambulation. There is a 60 mm drop in supine to standing per OT note. The patient denies dizziness with ambulating with OT today. 11/9/22 Urine Na 76  Prior CT 11/6/22 No acute abnormality  CXR: 11/7/22 No acute cardiopulmonary abnormality. Echo 11/16/22: Left Ventricle: Normal left ventricular systolic function with a visually estimated EF of 60 - 65%. Mitral Valve  Mild regurgitation.     Past Medical Hx:   Past Medical History:   Diagnosis Date    Altered mental status, unspecified 11/9/2018    Anxiety     Ataxia 11/9/2018    Back pain     Blurred vision     Bradycardia 10/2/2018    Chest pain     Depression     Dizziness     Dizziness and giddiness 10/17/2019    Dysautonomia ([address]     Fast heart beat     Fever 11/9/2018    Frequent headaches     Frequent urination     Hip dysplasia, congenital     Hx of syncope 10/24/2018    Hypotension     Ingrown left big toenail 3/31/2017    Joint pain     Joint swelling     Lumbar spinal stenosis     Memory deficit 9/11/2018    Muscle pain     Neuropathy     Orthostatic hypotension     asymptomatic    Pacemaker     new pacemaker July 2019    Recent change in weight     Risk for falls 12/13/2016    Sensory ataxia 11/9/2018    Sinus problem     Skin disease     Smoker 1/18/2018    SOB (shortness of breath)     Sore throat     SSS (sick sinus syndrome) (HCC)     Stiffness of joints, multiple sites     Stress     Stumbling gait 11/8/2018    Syncope     Tiredness     Trouble in sleeping     Weakness         Past Surgical Hx:     Past Surgical History:   Procedure Laterality Date    HX HIP REPLACEMENT Bilateral 1993    HX PACEMAKER PLACEMENT  10/16/2018 HX ROTATOR CUFF REPAIR Right 11/16/2017    MT CARDIAC SURG PROCEDURE UNLIST      MT INS NEW/RPLCMT PRM PM W/TRANSV ELTRD ATRIAL&VENT N/A 7/30/2019    INSERT PPM DUAL performed by Gabrielle Emery MD at [facility] CARDIAC CATH LAB       Medications:  Prior to Admission medications    Medication Sig Start Date End Date Taking? Authorizing Provider   gabapentin (NEURONTIN) 600 mg tablet Take 0.5 Tablets by mouth two (2) times a day. Max Daily Amount: 600 mg. 11/10/22  Yes Patricia Wright MD   midodrine (PROAMATINE) 5 mg tablet Take 1 Tablet by mouth five (5) times daily for 30 days. 11/10/22 12/10/22 Yes Patricia Wright MD   tiotropium bromide (Spiriva Respimat) 2.5 mcg/actuation inhaler Take 2 Puffs by inhalation daily. Indications: bronchospasm prevention with COPD, Continue using as prior to admission 11/11/22  Yes Patricia Wright MD   clonazePAM (KlonoPIN) 0.5 mg tablet Take 1 Tablet by mouth nightly. Max Daily Amount: 0.5 mg. 8/22/22  Yes Kendra GARCIA NP   atorvastatin (LIPITOR) 40 mg tablet Take 1 Tablet by mouth in the morning. 7/29/22  Yes Kendra GARCIA NP   fludrocortisone (FLORINEF) 0.1 mg tablet TAKE 1 TABLET BY MOUTH TWICE DAILY 12/27/19  Yes Darshana Molina MD   DULoxetine (CYMBALTA) 60 mg capsule TAKE 1 CAPSULE BY MOUTH DAILY 10/30/19  Yes Kendra GARCIA NP   acetaminophen (TYLENOL) 650 mg TbER Take 650 mg by mouth every eight (8) hours as needed for Pain. 10/13/19  Yes Darshana Molina MD   finasteride (PROSCAR) 5 mg tablet TAKE 1 TABLET BY MOUTH DAILY(PROSTATE) 2/1/19  Yes Darshana Molina MD   donepeziL (ARICEPT) 10 mg tablet Take 5 mg by mouth nightly. Patient not taking: Reported on 11/16/2022    Provider, Historical   polyethylene glycol (MIRALAX) 17 gram packet Take 17 g by mouth daily as needed for Constipation. 11/10/22   Patricia Wright MD   aspirin 81 mg chewable tablet Take 1 Tab by mouth daily.  9/3/19   Kacie Yancey, NP       No Known Allergies    Social Hx: reports that he quit smoking about 3 years ago. His smoking use included cigarettes. He has a 12.50 pack-year smoking history. He has never used smokeless tobacco. He reports that he does not currently use alcohol after a past usage of about 28.0 standard drinks per week. He reports that he does not use drugs. Family History   Problem Relation Age of Onset    Cancer Mother     Cancer Maternal Aunt        Review of Systems:  A twelve point review of system was performed today. Pertinent positives and negatives are mentioned in the HPI. The reminder of the ROS is negative and noncontributory.      Objective:    Vitals:    Vitals:    11/22/22 1009 11/22/22 1012 11/22/22 1016 11/22/22 1024   BP: (!) 91/57 (!) 92/46 (!) 82/54 108/62   Pulse:       Resp:       Temp:       SpO2:       Weight:       Height:         I&O's:  11/21 0701 - 11/22 0700  In: 240 [P.O.:240]  Out: 900 [Urine:900]  Visit Vitals  /62 (BP Patient Position: Sitting)   Pulse 85   Temp 98.5 °F (36.9 °C)   Resp 18   Ht 5' 11\" (1.803 m)   Wt 77.6 kg (171 lb)   SpO2 93%   BMI 23.85 kg/m²       Physical Exam:  General: NAD,Conversant   Neck:  Supple, no mass  Resp:  Diminished posteriorly, no wheezing , on 2L NC   CV:  RRR,  no LE edema  GI:  Soft, nontender, + Bowel sounds  Neurologic:  Non focal  Psych:             AAO x 3 appropriate affect   Skin:  No Rash  :  Voiding     Laboratory Results:    Lab Results   Component Value Date    BUN 14 11/18/2022     11/18/2022    K 3.0 (L) 11/18/2022     11/18/2022    CO2 27 11/18/2022       Lab Results   Component Value Date    BUN 14 11/18/2022    BUN 17 11/16/2022    BUN 23 (H) 11/16/2022    BUN 17 11/10/2022    BUN 16 11/08/2022    K 3.0 (L) 11/18/2022    K 3.2 (L) 11/16/2022    K 3.4 (L) 11/16/2022    K 3.2 (L) 11/10/2022    K 3.6 11/08/2022       Lab Results   Component Value Date    WBC 7.6 11/22/2022    RBC 3.83 (L) 11/22/2022    HGB 12.5 11/22/2022    HCT 36.7 11/22/2022    MCV 95.8 11/22/2022    MCH 32.6 11/22/2022    RDW 12.8 11/22/2022     11/22/2022       Lab Results   Component Value Date    PHOS 4.5 10/05/2019       Urine dipstick:   Lab Results   Component Value Date/Time    Color YELLOW/STRAW 11/06/2022 07:15 PM    Appearance CLEAR 11/06/2022 07:15 PM    Specific gravity 1.010 11/06/2022 07:15 PM    Specific gravity 1.006 10/26/2022 06:25 PM    pH (UA) 7.0 11/06/2022 07:15 PM    Protein Negative 11/06/2022 07:15 PM    Glucose Negative 11/06/2022 07:15 PM    Ketone Negative 11/06/2022 07:15 PM    Bilirubin Negative 11/06/2022 07:15 PM    Urobilinogen 0.2 11/06/2022 07:15 PM    Nitrites Negative 11/06/2022 07:15 PM    Leukocyte Esterase Negative 11/06/2022 07:15 PM    Epithelial cells FEW 11/06/2022 07:15 PM    Bacteria Negative 11/06/2022 07:15 PM    WBC 0-4 11/06/2022 07:15 PM    RBC 10-20 11/06/2022 07:15 PM       I have reviewed all pertinent labs, microbiology data, radiology imaging for my assessment. Medication list was personally reviewed. Thank you for allowing us to participate in the care of this patient. We will follow patient.        Zac Aleman NP  [facility] 11-Sep-2018

## 2023-01-20 ENCOUNTER — APPOINTMENT (OUTPATIENT)
Dept: CARDIOLOGY | Facility: CLINIC | Age: 58
End: 2023-01-20

## 2023-05-19 NOTE — ED PROCEDURE NOTE - CPROC ED TOLERANCE1
Patient tolerated procedure well.
Last BM not documented per flow sheet. GI: no gastrointestinal signs/symptoms

## 2023-09-21 NOTE — ED PROCEDURE NOTE - GENERAL INDICATIONS
Ng tube in stomach. Will confirm with stethoscope over abdomen and pushing air through the tube and KUB unclear even though I see it in the stomach.    Tuft fracture